# Patient Record
Sex: FEMALE | URBAN - METROPOLITAN AREA
[De-identification: names, ages, dates, MRNs, and addresses within clinical notes are randomized per-mention and may not be internally consistent; named-entity substitution may affect disease eponyms.]

---

## 2017-02-08 ENCOUNTER — OFFICE VISIT (OUTPATIENT)
Dept: FAMILY MEDICINE | Facility: CLINIC | Age: 32
End: 2017-02-08
Payer: COMMERCIAL

## 2017-02-08 VITALS
HEIGHT: 68 IN | DIASTOLIC BLOOD PRESSURE: 72 MMHG | BODY MASS INDEX: 23.19 KG/M2 | SYSTOLIC BLOOD PRESSURE: 100 MMHG | OXYGEN SATURATION: 100 % | WEIGHT: 153 LBS | HEART RATE: 72 BPM | TEMPERATURE: 98.2 F

## 2017-02-08 DIAGNOSIS — K13.0 LIP DRYNESS: Primary | ICD-10-CM

## 2017-02-08 PROCEDURE — 99213 OFFICE O/P EST LOW 20 MIN: CPT | Performed by: FAMILY MEDICINE

## 2017-02-08 RX ORDER — DESONIDE 0.5 MG/G
CREAM TOPICAL
Qty: 60 G | Refills: 0 | Status: SHIPPED | OUTPATIENT
Start: 2017-02-08

## 2017-02-08 NOTE — PROGRESS NOTES
SUBJECTIVE:                                                    Aram Sloan is a 31 year old female who presents to clinic today for the following health issues:    Concern - dry/burning lips     Onset: 1 week     Description:   C/o dry burning lips    Intensity: moderate    Progression of Symptoms:  worsening    Accompanying Signs & Symptoms:  Redness/cracking         Therapies Tried and outcome: lip balm          Problem list and histories reviewed & adjusted, as indicated.  Additional history: as documented    Patient Active Problem List   Diagnosis     Migraine with aura and without status migrainosus, not intractable     Past Surgical History   Procedure Laterality Date      section              Social History   Substance Use Topics     Smoking status: Never Smoker      Smokeless tobacco: Not on file     Alcohol Use: No     Family History   Problem Relation Age of Onset     DIABETES No family hx of      Coronary Artery Disease No family hx of      Hyperlipidemia No family hx of      Hypertension Father      CEREBROVASCULAR DISEASE No family hx of      Breast Cancer No family hx of      Colon Cancer No family hx of          Current Outpatient Prescriptions   Medication Sig Dispense Refill     desonide (DESOWEN) 0.05 % cream Apply sparingly to affected area three times daily for 14 days. 60 g 0     ZOLMitriptan (ZOMIG) 2.5 MG tablet Take 1-2 tablets (2.5-5 mg) by mouth at onset of headache for migraine May repeat in 2 hours. Max 4 tablets/24 hours. 18 tablet 1     Problem list, Medication list, Allergies, and Medical/Social/Surgical histories reviewed in EPIC and updated as appropriate.    ROS:  C: NEGATIVE for fever, chills, change in weight  E/M: NEGATIVE for ear, mouth and throat problems  R: NEGATIVE for significant cough or SOB  CV: NEGATIVE for chest pain, palpitations or peripheral edema    OBJECTIVE:                                                    /72 mmHg  Pulse 72  Temp([facility])  "98.2  F (36.8  C) (Tympanic)  Ht 5' 7.75\" (1.721 m)  Wt 153 lb (69.4 kg)  BMI 23.43 kg/m2  SpO2 100%  LMP 01/24/2017 (Exact Date)  Body mass index is 23.43 kg/(m^2).   GENERAL: healthy, alert, well nourished, well hydrated, no distress  HENT: ear canals- normal; TMs- normal; Nose- normal; Mouth- no ulcers, no lesions  NECK: no tenderness, no adenopathy, no asymmetry, no masses, no stiffness; thyroid- normal to palpation  RESP: lungs clear to auscultation - no rales, no rhonchi, no wheezes    Lips are dry small cracks and some discoloration.      ASSESSMENT/PLAN:                                                        ICD-10-CM    1. Lip dryness K13.0 desonide (DESOWEN) 0.05 % cream       Patient has lip dryness with some lip discoloration, suggested to use the  Vaseline all the time, use desonide cream 3 times a day for a week and then BID for a week, if no improvement, may follow up to see if any other etiology .  Avoid lip touching by tongue if possible.    Jonas Britton MD  [facility]    "

## 2017-02-09 NOTE — NURSING NOTE
"Chief Complaint   Patient presents with     Mouth/Lip Problem       Initial /72 mmHg  Pulse 72  Temp(Src) 98.2  F (36.8  C) (Tympanic)  Ht 5' 7.75\" (1.721 m)  Wt 153 lb (69.4 kg)  BMI 23.43 kg/m2  SpO2 100%  LMP 01/24/2017 (Exact Date) Estimated body mass index is 23.43 kg/(m^2) as calculated from the following:    Height as of this encounter: 5' 7.75\" (1.721 m).    Weight as of this encounter: 153 lb (69.4 kg).  Medication Reconciliation: complete    Current Outpatient Prescriptions   Medication Sig Dispense Refill     ZOLMitriptan (ZOMIG) 2.5 MG tablet Take 1-2 tablets (2.5-5 mg) by mouth at onset of headache for migraine May repeat in 2 hours. Max 4 tablets/24 hours. 18 tablet 1       Jonah M, CMA    "

## 2017-06-29 ENCOUNTER — OFFICE VISIT (OUTPATIENT)
Dept: FAMILY MEDICINE | Facility: CLINIC | Age: 32
End: 2017-06-29
Payer: COMMERCIAL

## 2017-06-29 VITALS
TEMPERATURE: 98.2 F | BODY MASS INDEX: 23.52 KG/M2 | OXYGEN SATURATION: 98 % | DIASTOLIC BLOOD PRESSURE: 66 MMHG | RESPIRATION RATE: 16 BRPM | SYSTOLIC BLOOD PRESSURE: 95 MMHG | HEIGHT: 68 IN | WEIGHT: 155.2 LBS | HEART RATE: 68 BPM

## 2017-06-29 DIAGNOSIS — M72.2 PLANTAR FASCIITIS: Primary | ICD-10-CM

## 2017-06-29 PROCEDURE — 99213 OFFICE O/P EST LOW 20 MIN: CPT | Performed by: PHYSICIAN ASSISTANT

## 2017-06-29 RX ORDER — NAPROXEN 500 MG/1
500 TABLET ORAL 2 TIMES DAILY WITH MEALS
Qty: 30 TABLET | Refills: 0 | Status: SHIPPED | OUTPATIENT
Start: 2017-06-29

## 2017-06-29 NOTE — PROGRESS NOTES
SUBJECTIVE:                                                    Aram Sloan is a 31 year old female who presents to clinic today for the following health issues:      Joint Pain    Onset: One week ago    Description:   Location: Rt heel  Character: Dull ache and pain when pt walks    Intensity: severe    Progression of Symptoms: intermittent, some days it feels fine and other days it's painful    Accompanying Signs & Symptoms:  Other symptoms: none    History:   Previous similar pain: no       Precipitating factors:   Trauma or overuse: no     Alleviating factors:  Improved by: ice    Therapies Tried and outcome: ice pack - did not help         Sudden onset of right heel pain after standing for long periods of time, this is worse with walking, no injury noted.  No swelling or redness noted.          Problem list and histories reviewed & adjusted, as indicated.  Additional history: as documented    Patient Active Problem List   Diagnosis     Migraine with aura and without status migrainosus, not intractable     Past Surgical History:   Procedure Laterality Date      SECTION             Social History   Substance Use Topics     Smoking status: Never Smoker     Smokeless tobacco: Not on file     Alcohol use No     Family History   Problem Relation Age of Onset     DIABETES No family hx of      Coronary Artery Disease No family hx of      Hyperlipidemia No family hx of      Hypertension Father      CEREBROVASCULAR DISEASE No family hx of      Breast Cancer No family hx of      Colon Cancer No family hx of          Current Outpatient Prescriptions   Medication Sig Dispense Refill     naproxen (NAPROSYN) 500 MG tablet Take 1 tablet (500 mg) by mouth 2 times daily (with meals) 30 tablet 0     desonide (DESOWEN) 0.05 % cream Apply sparingly to affected area three times daily for 14 days. (Patient not taking: Reported on 2017) 60 g 0     ZOLMitriptan (ZOMIG) 2.5 MG tablet Take 1-2 tablets (2.5-5 mg) by  "mouth at onset of headache for migraine May repeat in 2 hours. Max 4 tablets/24 hours. (Patient not taking: Reported on 6/29/2017) 18 tablet 1     No Known Allergies    Reviewed and updated as needed this visit by clinical staff       Reviewed and updated as needed this visit by Provider         ROS:  Constitutional, HEENT, cardiovascular, pulmonary, gi and gu systems are negative, except as otherwise noted.    OBJECTIVE:     BP 95/66 (BP Location: Left arm, Patient Position: Chair, Cuff Size: Adult Regular)  Pulse 68  Temp 98.2  F (36.8  C) (Tympanic)  Resp 16  Ht 5' 7.72\" (1.72 m)  Wt 155 lb 3.2 oz (70.4 kg)  LMP 06/07/2017  SpO2 98%  BMI 23.8 kg/m2  Body mass index is 23.8 kg/(m^2).  GENERAL: healthy, alert and no distress  MS: no gross musculoskeletal defects noted, no edema, TTP along right heel, no kole TTP, FROM of right foot, full pedal pulses  PSYCH: mentation appears normal, affect normal/bright    Diagnostic Test Results:  none     ASSESSMENT/PLAN:     1. Plantar fasciitis  Advise exercises ( provided) NSAIDs, and shoe inserts for comfort.  She will following up if pain is persistent  - naproxen (NAPROSYN) 500 MG tablet; Take 1 tablet (500 mg) by mouth 2 times daily (with meals)  Dispense: 30 tablet; Refill: 0          See Patient Instructions    Duncan Cueva PA-C  [facility] [place]  "

## 2017-06-29 NOTE — NURSING NOTE
"Chief Complaint   Patient presents with     Musculoskeletal Problem     rt heel       Initial BP 95/66 (BP Location: Left arm, Patient Position: Chair, Cuff Size: Adult Regular)  Pulse 68  Temp 98.2  F (36.8  C) (Tympanic)  Resp 16  Ht 5' 7.72\" (1.72 m)  Wt 155 lb 3.2 oz (70.4 kg)  LMP 06/07/2017  SpO2 98%  BMI 23.8 kg/m2 Estimated body mass index is 23.8 kg/(m^2) as calculated from the following:    Height as of this encounter: 5' 7.72\" (1.72 m).    Weight as of this encounter: 155 lb 3.2 oz (70.4 kg).  Medication Reconciliation: complete    Lia Colindres MA  "

## 2017-06-29 NOTE — MR AVS SNAPSHOT
After Visit Summary   6/29/2017    Aram Sloan    MRN: [id number]           Patient Information     Date Of Birth          1985        Visit Information        Provider Department      6/29/2017 9:40 AM Duncan Cueva PA-C [facility]        Today's Diagnoses     Plantar fasciitis    -  1      Care Instructions                           Plantar Fasciitis   What is plantar fasciitis?   Plantar fasciitis is a painful inflammation of the bottom of the foot between the ball of the foot and the heel.   How does it occur?   There are several possible causes of plantar fasciitis, including:     wearing high heels     gaining weight     increased walking, standing, or stair-climbing   If you wear high-heeled shoes, including western-style boots, for long periods of time, the tough, tendonlike tissue of the bottom of your foot can become shorter. This layer of tissue is called fascia. Pain occurs when you stretch fascia that has shortened. This painful stretching might happen, for example, when you walk barefoot after getting out of bed in the morning.   If you gain weight, you might be more likely to have plantar fasciitis, especially if you walk a lot or  shoes with poor heel cushioning. Normally there is a pad of fatty tissue under your heel bone. Weight gain might break down this fat pad and cause heel pain.   Runners may get plantar fasciitis when they change their workout and increase their mileage or frequency of workouts. It can also occur with a change in exercise surface or terrain, or if your shoes are worn out and don't provide enough cushion for your heels.   If the arches of your foot are abnormally high or low, you are more likely to develop plantar fasciitis than if your arches are normal.   What are the symptoms?   The main symptom of plantar fasciitis is heel pain when you walk. You may also feel pain when you stand and possibly even when you are  resting. This pain typically occurs first thing in the morning after you get out of bed, when your foot is placed flat on the floor. The pain occurs because you are stretching the plantar fascia. The pain usually lessens with more walking, but you may have it again after periods of rest.   You may feel no pain when you are sleeping because the position of your feet during rest allows the fascia to shorten and relax.   How is it diagnosed?   Your healthcare provider will ask about your symptoms. He or she will ask if the bottom of your heel is tender and if you have pain when you stretch the bottom of your foot. An X-ray of your heel may be done.   How is it treated?     Give your painful heel lots of rest. You may need to stay completely off your foot for several days when the pain is severe.     Your healthcare provider may recommend or prescribe anti-inflammatory medicines, such as aspirin or ibuprofen. These drugs decrease pain and inflammation. Adults aged 65 years and older should not take non-steroidal anti-inflammatory medicine for more than 7 days without their healthcare provider's approval. Resting your heel on an ice pack for a few minutes several times a day can also help.     Try to cushion your foot. You can do this by wearing athletic shoes, even at work, for awhile. Heel cushions can also be used. The cushions should be worn in both shoes. They are most helpful if you are overweight or an older adult.     Your provider may recommend special arch supports or inserts for your shoes called orthotics, either custom-made or off the shelf. These supports can be particularly helpful if you have flat feet or high arches.     Your provider may recommend an injection of a cortisone-like medicine.     Lose weight if needed.     A night splint may be recommended. This will keep the plantar fascia stretched while you are sleeping.     Physical therapy for additional treatments may be recommended     Surgery is  rarely needed.   How long will the effects last?   You may find that the pain is sometimes worse and sometimes better over time. If you get treatment soon after you notice the pain, the symptoms should stop after several weeks. If, however, you have had plantar fasciitis for a long time, it may take many weeks to months for the pain to go away.   When can I return to my normal activities?   Everyone recovers from an injury at a different rate. Return to your activities will be determined by how soon your foot recovers, not by how many days or weeks it has been since your injury has occurred. In general, the longer you have symptoms before you start treatment, the longer it will take to get better. The goal of rehabilitation is to return you to your normal activities as soon as is safely possible. If you return too soon you may worsen your injury.   You may safely return to your activities when, starting from the top of the list and progressing to the end, each of the following is true:     You have full range of motion in the injured foot compared with the uninjured foot.     You have full strength of the injured foot compared with the uninjured foot.     You can walk straight ahead without significant pain or limping.   How can I prevent plantar fasciitis?   The best way to prevent plantar fasciitis is to wear shoes that are well made and fit your feet. This is especially important when you exercise or walk a lot or stand for a long time on hard surfaces. Get new athletic shoes before your old shoes stop supporting and cushioning your feet.   You should also:     Avoid repeated jarring to the heel.     Keep a healthy weight.     Do your leg and foot stretching exercises regularly.   Developed by [employer].   Published by [employer].   Last modified: 2008-08-11   Last reviewed: 2008-07-07   This content is reviewed periodically and is subject to change as new health information becomes available. The information  is intended to inform and educate and is not a replacement for medical evaluation, advice, diagnosis or treatment by a healthcare professional.   Sports Medicine Advisor 2009.1 Index  Sports Medicine Advisor 2009.1 Credits     2009 [facility] and/or its affiliates. All Rights Reserved.               Plantar Fasciitis Rehabilitation Exercises   You may begin exercising the muscles of your foot right away by gently stretching them as follows:     Prone hip extension: Lie on your stomach with your legs straight out behind you. Tighten the buttocks and thigh muscles of your injured leg and lift it off the floor about 8 inches. Keep your knee straight. Hold for 5 seconds. Then lower your leg and relax. Do 3 sets of 10.     Towel stretch: Sit on a hard surface with one leg stretched out in front of you. Loop a towel around your toes and the ball of your foot and pull the towel toward your body keeping your knee straight. Hold this position for 15 to 30 seconds then relax. Repeat 3 times.   When the towel stretch becomes too easy, you may begin doing the standing calf stretch.     Standing calf stretch: Facing a wall, put your hands against the wall at about eye level. Keep one leg back with the heel on the floor, and the other leg forward. Turn your back foot slightly inward (as if you were pigeon-toed) as you slowly lean into the wall until you feel a stretch in the back of your calf. Hold for 15 to 30 seconds. Repeat 3 times and then switch the position of your legs and repeat the exercise 3 times. Do this exercise several times each day.     Sitting plantar fascia stretch: Sit in a chair and cross one foot over your other knee. Grab the base of your toes and pull them back toward your leg until you feel a comfortable stretch. Hold 15 seconds and repeat 3 times.   When you can stand comfortably on your injured foot, you can begin standing to stretch the bottom of your foot using the plantar fascia stretch.     Achilles  stretch: Stand with the ball of one foot on a stair. Reach for the bottom step with your heel until you feel a stretch in the arch of your foot. Hold this position for 15 to 30 seconds and then relax. Repeat 3 times.   After you have stretched the bottom muscles of your foot, you can begin strengthening the top muscles of your foot.     Frozen can roll: Roll your bare injured foot back and forth from your heel to your mid-arch over a frozen juice can. Repeat for 3 to 5 minutes. This exercise is particularly helpful if done first thing in the morning.     Towel pickup: With your heel on the ground,  a towel with your toes. Release. Repeat 10 to 20 times. When this gets easy, add more resistance by placing a book or small weight on the towel.     Balance and reach exercises   Stand upright next to a chair with your injured leg farthest from the chair. This will provide you with support if you need it. Stand just on the foot of your injured leg. Try to raise the arch of this foot while keeping your toes on the floor.   A. Keep your foot in this position and reach forward in front of you with the hand farthest away from the chair, allowing your knee to bend. Repeat this 10 times while maintaining the arch height. This exercise can be made more difficult by reaching farther in front of you. Do 2 sets.   B.  the same position as above. While maintaining your arch height, reach the hand farthest away from the chair across your body toward the chair. The farther you reach, the more challenging the exercise. Do 2 sets of 10.     Heel raise: Balance yourself while standing behind a chair or counter. Using the chair to help you, raise your body up onto your toes and hold for 5 seconds. Then slowly lower yourself down without holding onto the chair. Hold onto the chair or counter if you need to. When this exercise becomes less painful, try lowering on one leg only. Repeat 10 times. Do 3 sets of 10.     Side-lying  leg lift: Lying on your uninjured side, tighten the front thigh muscles on your top leg and lift that leg 8 to 10 inches away from the other leg. Keep the leg straight and lower slowly. Do 3 sets of 10.   Written by Swathi Joe, MS, PT, and Gisselle Anderson PT, [facility], OCS, for [employer].   Published by [employer].   Last modified: 2009-02-09   Last reviewed: 2008-07-07   This content is reviewed periodically and is subject to change as new health information becomes available. The information is intended to inform and educate and is not a replacement for medical evaluation, advice, diagnosis or treatment by a healthcare professional.   Sports Medicine Advisor 2009.1 Index                      Follow-ups after your visit        Who to contact     If you have questions or need follow up information about today's clinic visit or your schedule please contact [facility] [place] directly at [phone].  Normal or non-critical lab and imaging results will be communicated to you by MyChart, letter or phone within 4 business days after the clinic has received the results. If you do not hear from us within 7 days, please contact the clinic through [employer] [facility] or phone. If you have a critical or abnormal lab result, we will notify you by phone as soon as possible.  Submit refill requests through [employer] or call your pharmacy and they will forward the refill request to us. Please allow 3 business days for your refill to be completed.          Additional Information About Your Visit        [employer] Information     [employer] gives you secure access to your electronic health record. If you see a primary care provider, you can also send messages to your care team and make appointments. If you have questions, please call your primary care clinic.  If you do not have a primary care provider, please call [phone] and they will assist you.        Care EveryWhere ID     This is your Care EveryWhere ID. This could be used by  "other organizations to access your [facility] medical records  [id number]        Your Vitals Were     Pulse Temperature Respirations Height Last Period Pulse Oximetry    68 98.2  F (36.8  C) (Tympanic) 16 5' 7.72\" (1.72 m) 06/07/2017 98%    BMI (Body Mass Index)                   23.8 kg/m2            Blood Pressure from Last 3 Encounters:   06/29/17 95/66   02/08/17 100/72   11/16/16 90/70    Weight from Last 3 Encounters:   06/29/17 155 lb 3.2 oz (70.4 kg)   02/08/17 153 lb (69.4 kg)   11/16/16 157 lb (71.2 kg)              Today, you had the following     No orders found for display         Today's Medication Changes          These changes are accurate as of: 6/29/17 10:13 AM.  If you have any questions, ask your nurse or doctor.               Start taking these medicines.        Dose/Directions    naproxen 500 MG tablet   Commonly known as:  NAPROSYN   Used for:  Plantar fasciitis   Started by:  Duncan Cueva PA-C        Dose:  500 mg   Take 1 tablet (500 mg) by mouth 2 times daily (with meals)   Quantity:  30 tablet   Refills:  0            Where to get your medicines      [facility] - [place], [place] - [address] AT [address]  [address] [facility] [place] [address]     Phone:  [phone]     naproxen 500 MG tablet                Primary Care Provider    None Specified       No primary provider on file.        Equal Access to Services     ERON UNGER AH: Hadii tony clancyo Soken, waaxda luqadaha, qaybta kaalmada adeegyada, nasim posadas. So [facility] [phone].    ATENCIÓN: Si habla español, tiene a saldana disposición servicios gratuitos de asistencia lingüística. Llame al [phone].    We comply with applicable federal civil rights laws and [place] laws. We do not discriminate on the basis of race, color, national origin, age, disability sex, sexual orientation or gender " identity.            Thank you!     Thank you for choosing [facility] [place]  for your care. Our goal is always to provide you with excellent care. Hearing back from our patients is one way we can continue to improve our services. Please take a few minutes to complete the written survey that you may receive in the mail after your visit with us. Thank you!             Your Updated Medication List - Protect others around you: Learn how to safely use, store and throw away your medicines at www.disposemymeds.org.          This list is accurate as of: 6/29/17 10:13 AM.  Always use your most recent med list.                   Brand Name Dispense Instructions for use Diagnosis    desonide 0.05 % cream    DESOWEN    60 g    Apply sparingly to affected area three times daily for 14 days.    Lip dryness       naproxen 500 MG tablet    NAPROSYN    30 tablet    Take 1 tablet (500 mg) by mouth 2 times daily (with meals)    Plantar fasciitis       ZOLMitriptan 2.5 MG tablet    ZOMIG    18 tablet    Take 1-2 tablets (2.5-5 mg) by mouth at onset of headache for migraine May repeat in 2 hours. Max 4 tablets/24 hours.    Migraine with aura and without status migrainosus, not intractable

## 2017-06-29 NOTE — PATIENT INSTRUCTIONS
Plantar Fasciitis   What is plantar fasciitis?   Plantar fasciitis is a painful inflammation of the bottom of the foot between the ball of the foot and the heel.   How does it occur?   There are several possible causes of plantar fasciitis, including:     wearing high heels     gaining weight     increased walking, standing, or stair-climbing   If you wear high-heeled shoes, including western-style boots, for long periods of time, the tough, tendonlike tissue of the bottom of your foot can become shorter. This layer of tissue is called fascia. Pain occurs when you stretch fascia that has shortened. This painful stretching might happen, for example, when you walk barefoot after getting out of bed in the morning.   If you gain weight, you might be more likely to have plantar fasciitis, especially if you walk a lot or  shoes with poor heel cushioning. Normally there is a pad of fatty tissue under your heel bone. Weight gain might break down this fat pad and cause heel pain.   Runners may get plantar fasciitis when they change their workout and increase their mileage or frequency of workouts. It can also occur with a change in exercise surface or terrain, or if your shoes are worn out and don't provide enough cushion for your heels.   If the arches of your foot are abnormally high or low, you are more likely to develop plantar fasciitis than if your arches are normal.   What are the symptoms?   The main symptom of plantar fasciitis is heel pain when you walk. You may also feel pain when you stand and possibly even when you are resting. This pain typically occurs first thing in the morning after you get out of bed, when your foot is placed flat on the floor. The pain occurs because you are stretching the plantar fascia. The pain usually lessens with more walking, but you may have it again after periods of rest.   You may feel no pain when you are sleeping because the position of your feet  during rest allows the fascia to shorten and relax.   How is it diagnosed?   Your healthcare provider will ask about your symptoms. He or she will ask if the bottom of your heel is tender and if you have pain when you stretch the bottom of your foot. An X-ray of your heel may be done.   How is it treated?     Give your painful heel lots of rest. You may need to stay completely off your foot for several days when the pain is severe.     Your healthcare provider may recommend or prescribe anti-inflammatory medicines, such as aspirin or ibuprofen. These drugs decrease pain and inflammation. Adults aged 65 years and older should not take non-steroidal anti-inflammatory medicine for more than 7 days without their healthcare provider's approval. Resting your heel on an ice pack for a few minutes several times a day can also help.     Try to cushion your foot. You can do this by wearing athletic shoes, even at work, for awhile. Heel cushions can also be used. The cushions should be worn in both shoes. They are most helpful if you are overweight or an older adult.     Your provider may recommend special arch supports or inserts for your shoes called orthotics, either custom-made or off the shelf. These supports can be particularly helpful if you have flat feet or high arches.     Your provider may recommend an injection of a cortisone-like medicine.     Lose weight if needed.     A night splint may be recommended. This will keep the plantar fascia stretched while you are sleeping.     Physical therapy for additional treatments may be recommended     Surgery is rarely needed.   How long will the effects last?   You may find that the pain is sometimes worse and sometimes better over time. If you get treatment soon after you notice the pain, the symptoms should stop after several weeks. If, however, you have had plantar fasciitis for a long time, it may take many weeks to months for the pain to go away.   When can I return to  my normal activities?   Everyone recovers from an injury at a different rate. Return to your activities will be determined by how soon your foot recovers, not by how many days or weeks it has been since your injury has occurred. In general, the longer you have symptoms before you start treatment, the longer it will take to get better. The goal of rehabilitation is to return you to your normal activities as soon as is safely possible. If you return too soon you may worsen your injury.   You may safely return to your activities when, starting from the top of the list and progressing to the end, each of the following is true:     You have full range of motion in the injured foot compared with the uninjured foot.     You have full strength of the injured foot compared with the uninjured foot.     You can walk straight ahead without significant pain or limping.   How can I prevent plantar fasciitis?   The best way to prevent plantar fasciitis is to wear shoes that are well made and fit your feet. This is especially important when you exercise or walk a lot or stand for a long time on hard surfaces. Get new athletic shoes before your old shoes stop supporting and cushioning your feet.   You should also:     Avoid repeated jarring to the heel.     Keep a healthy weight.     Do your leg and foot stretching exercises regularly.   Developed by [employer].   Published by [employer].   Last modified: 2008-08-11   Last reviewed: 2008-07-07   This content is reviewed periodically and is subject to change as new health information becomes available. The information is intended to inform and educate and is not a replacement for medical evaluation, advice, diagnosis or treatment by a healthcare professional.   Sports Medicine Advisor 2009.1 Index  Sports Medicine Advisor 2009.1 Credits     2009 [employer] and/or its affiliates. All Rights Reserved.               Plantar Fasciitis Rehabilitation Exercises   You may begin  exercising the muscles of your foot right away by gently stretching them as follows:     Prone hip extension: Lie on your stomach with your legs straight out behind you. Tighten the buttocks and thigh muscles of your injured leg and lift it off the floor about 8 inches. Keep your knee straight. Hold for 5 seconds. Then lower your leg and relax. Do 3 sets of 10.     Towel stretch: Sit on a hard surface with one leg stretched out in front of you. Loop a towel around your toes and the ball of your foot and pull the towel toward your body keeping your knee straight. Hold this position for 15 to 30 seconds then relax. Repeat 3 times.   When the towel stretch becomes too easy, you may begin doing the standing calf stretch.     Standing calf stretch: Facing a wall, put your hands against the wall at about eye level. Keep one leg back with the heel on the floor, and the other leg forward. Turn your back foot slightly inward (as if you were pigeon-toed) as you slowly lean into the wall until you feel a stretch in the back of your calf. Hold for 15 to 30 seconds. Repeat 3 times and then switch the position of your legs and repeat the exercise 3 times. Do this exercise several times each day.     Sitting plantar fascia stretch: Sit in a chair and cross one foot over your other knee. Grab the base of your toes and pull them back toward your leg until you feel a comfortable stretch. Hold 15 seconds and repeat 3 times.   When you can stand comfortably on your injured foot, you can begin standing to stretch the bottom of your foot using the plantar fascia stretch.     Achilles stretch: Stand with the ball of one foot on a stair. Reach for the bottom step with your heel until you feel a stretch in the arch of your foot. Hold this position for 15 to 30 seconds and then relax. Repeat 3 times.   After you have stretched the bottom muscles of your foot, you can begin strengthening the top muscles of your foot.     Frozen can roll: Roll  your bare injured foot back and forth from your heel to your mid-arch over a frozen juice can. Repeat for 3 to 5 minutes. This exercise is particularly helpful if done first thing in the morning.     Towel pickup: With your heel on the ground,  a towel with your toes. Release. Repeat 10 to 20 times. When this gets easy, add more resistance by placing a book or small weight on the towel.     Balance and reach exercises   Stand upright next to a chair with your injured leg farthest from the chair. This will provide you with support if you need it. Stand just on the foot of your injured leg. Try to raise the arch of this foot while keeping your toes on the floor.   A. Keep your foot in this position and reach forward in front of you with the hand farthest away from the chair, allowing your knee to bend. Repeat this 10 times while maintaining the arch height. This exercise can be made more difficult by reaching farther in front of you. Do 2 sets.   B.  the same position as above. While maintaining your arch height, reach the hand farthest away from the chair across your body toward the chair. The farther you reach, the more challenging the exercise. Do 2 sets of 10.     Heel raise: Balance yourself while standing behind a chair or counter. Using the chair to help you, raise your body up onto your toes and hold for 5 seconds. Then slowly lower yourself down without holding onto the chair. Hold onto the chair or counter if you need to. When this exercise becomes less painful, try lowering on one leg only. Repeat 10 times. Do 3 sets of 10.     Side-lying leg lift: Lying on your uninjured side, tighten the front thigh muscles on your top leg and lift that leg 8 to 10 inches away from the other leg. Keep the leg straight and lower slowly. Do 3 sets of 10.   Written by Swathi Joe, MS, PT, and Gisselle Anderson PT, [facility], OCS, for [employer].   Published by [employer].   Last modified: 2009-02-09   Last  reviewed: 2008-07-07   This content is reviewed periodically and is subject to change as new health information becomes available. The information is intended to inform and educate and is not a replacement for medical evaluation, advice, diagnosis or treatment by a healthcare professional.   Sports Medicine Advisor 2009.1 Index

## 2017-09-22 ENCOUNTER — OFFICE VISIT (OUTPATIENT)
Dept: FAMILY MEDICINE | Facility: CLINIC | Age: 32
End: 2017-09-22
Payer: COMMERCIAL

## 2017-09-22 VITALS
OXYGEN SATURATION: 99 % | BODY MASS INDEX: 24.07 KG/M2 | WEIGHT: 157 LBS | SYSTOLIC BLOOD PRESSURE: 94 MMHG | HEART RATE: 70 BPM | TEMPERATURE: 98.9 F | DIASTOLIC BLOOD PRESSURE: 60 MMHG

## 2017-09-22 DIAGNOSIS — G43.109 MIGRAINE WITH AURA AND WITHOUT STATUS MIGRAINOSUS, NOT INTRACTABLE: Primary | ICD-10-CM

## 2017-09-22 DIAGNOSIS — Z23 NEED FOR PROPHYLACTIC VACCINATION AND INOCULATION AGAINST INFLUENZA: ICD-10-CM

## 2017-09-22 PROCEDURE — 99214 OFFICE O/P EST MOD 30 MIN: CPT | Performed by: FAMILY MEDICINE

## 2017-09-22 PROCEDURE — 90471 IMMUNIZATION ADMIN: CPT | Performed by: FAMILY MEDICINE

## 2017-09-22 PROCEDURE — 90686 IIV4 VACC NO PRSV 0.5 ML IM: CPT | Performed by: FAMILY MEDICINE

## 2017-09-22 RX ORDER — ZOLMITRIPTAN 2.5 MG/1
2.5-5 TABLET, FILM COATED ORAL
Qty: 18 TABLET | Refills: 1 | Status: SHIPPED | OUTPATIENT
Start: 2017-09-22 | End: 2018-01-11

## 2017-09-22 NOTE — MR AVS SNAPSHOT
After Visit Summary   9/22/2017    Aram Sloan    MRN: [id number]           Patient Information     Date Of Birth          1985        Visit Information        Provider Department      9/22/2017 2:00 PM Jonas Britton MD [facility] [place]        Today's Diagnoses     Migraine with aura and without status migrainosus, not intractable    -  1       Follow-ups after your visit        Who to contact     If you have questions or need follow up information about today's clinic visit or your schedule please contact [facility] [place] directly at [phone].  Normal or non-critical lab and imaging results will be communicated to you by MyChart, letter or phone within 4 business days after the clinic has received the results. If you do not hear from us within 7 days, please contact the clinic through [employer] or phone. If you have a critical or abnormal lab result, we will notify you by phone as soon as possible.  Submit refill requests through [employer] or call your pharmacy and they will forward the refill request to us. Please allow 3 business days for your refill to be completed.          Additional Information About Your Visit        MyChart Information     [employer] gives you secure access to your electronic health record. If you see a primary care provider, you can also send messages to your care team and make appointments. If you have questions, please call your primary care clinic.  If you do not have a primary care provider, please call [phone] and they will assist you.        Care EveryWhere ID     This is your Care EveryWhere ID. This could be used by other organizations to access your [facility] medical records  [id number]        Your Vitals Were     Pulse Temperature Last Period Pulse Oximetry BMI (Body Mass Index)       70 98.9  F (37.2  C) (Tympanic) 09/04/2017 99% 24.07 kg/m2        Blood Pressure from Last 3 Encounters:   09/22/17 94/60   06/29/17 95/66   02/08/17  100/72    Weight from Last 3 Encounters:   09/22/17 157 lb (71.2 kg)   06/29/17 155 lb 3.2 oz (70.4 kg)   02/08/17 153 lb (69.4 kg)              Today, you had the following     No orders found for display         Where to get your medicines      These medications were sent to [facility]/pharmacy #[id number] - [place], [place] - [address]  [address], [place] [address]     Phone:  [phone]     ZOLMitriptan 2.5 MG tablet          Primary Care Provider    None Specified       No primary provider on file.        Equal Access to Services     [facility]: Hadii tony thomas hadasho Soken, waaxda luqadaha, qaybta kaalmada adekathyyada, nasim yates . So [facility] [phone].    ATENCIÓN: Si habla español, tiene a saldana disposición servicios gratuitos de asistencia lingüística. Llame al [phone].    We comply with applicable federal civil rights laws and [place] laws. We do not discriminate on the basis of race, color, national origin, age, disability sex, sexual orientation or gender identity.            Thank you!     Thank you for choosing [facility] [place]  for your care. Our goal is always to provide you with excellent care. Hearing back from our patients is one way we can continue to improve our services. Please take a few minutes to complete the written survey that you may receive in the mail after your visit with us. Thank you!             Your Updated Medication List - Protect others around you: Learn how to safely use, store and throw away your medicines at www.disposemymeds.org.          This list is accurate as of: 9/22/17  2:26 PM.  Always use your most recent med list.                   Brand Name Dispense Instructions for use Diagnosis    desonide 0.05 % cream    DESOWEN    60 g    Apply sparingly to affected area three times daily for 14 days.    Lip dryness       naproxen 500 MG tablet    NAPROSYN    30 tablet    Take 1 tablet (500 mg) by mouth 2 times  daily (with meals)    Plantar fasciitis       ZOLMitriptan 2.5 MG tablet    ZOMIG    18 tablet    Take 1-2 tablets (2.5-5 mg) by mouth at onset of headache for migraine May repeat in 2 hours. Max 4 tablets/24 hours.    Migraine with aura and without status migrainosus, not intractable

## 2017-09-22 NOTE — PROGRESS NOTES
SUBJECTIVE:   Aram Sloan is a 32 year old female who presents to clinic today for the following health issues:      Migraine Follow-Up    Headaches symptoms:  Waxing and wanning    Frequency: monthly     Duration of headaches: 2 days    Able to do normal daily activities/work with migraines: Yes    Rescue/Relief medication:ibuprofen (Advil, Motrin) and Zomig              Effectiveness: moderate relief    In the past 4 weeks, how often have you gone to Urgent Care or the emergency room because of your headaches?  0     Patient only used headache medication once or twice in 3-4 months.    She has associated nausea and some photophobia.    Traveling to [place] and would like to get a refill on her medication .            Problem list and histories reviewed & adjusted, as indicated.  Additional history: as documented    Patient Active Problem List   Diagnosis     Migraine with aura and without status migrainosus, not intractable     Past Surgical History:   Procedure Laterality Date      SECTION             Social History   Substance Use Topics     Smoking status: Never Smoker     Smokeless tobacco: Not on file     Alcohol use No     Family History   Problem Relation Age of Onset     DIABETES No family hx of      Coronary Artery Disease No family hx of      Hyperlipidemia No family hx of      Hypertension Father      CEREBROVASCULAR DISEASE No family hx of      Breast Cancer No family hx of      Colon Cancer No family hx of          Current Outpatient Prescriptions   Medication Sig Dispense Refill     ZOLMitriptan (ZOMIG) 2.5 MG tablet Take 1-2 tablets (2.5-5 mg) by mouth at onset of headache for migraine May repeat in 2 hours. Max 4 tablets/24 hours. 18 tablet 1     naproxen (NAPROSYN) 500 MG tablet Take 1 tablet (500 mg) by mouth 2 times daily (with meals) (Patient not taking: Reported on 2017) 30 tablet 0     desonide (DESOWEN) 0.05 % cream Apply sparingly to affected area three times daily for 14  days. (Patient not taking: Reported on 9/22/2017) 60 g 0     [DISCONTINUED] ZOLMitriptan (ZOMIG) 2.5 MG tablet Take 1-2 tablets (2.5-5 mg) by mouth at onset of headache for migraine May repeat in 2 hours. Max 4 tablets/24 hours. 18 tablet 1         Reviewed and updated as needed this visit by clinical staffTobacco  Allergies  Meds       Reviewed and updated as needed this visit by Provider         ROS:  C: NEGATIVE for fever, chills, change in weight  E/M: NEGATIVE for ear, mouth and throat problems  R: NEGATIVE for significant cough or SOB  CV: NEGATIVE for chest pain, palpitations or peripheral edema    OBJECTIVE:                                                    BP 94/60  Pulse 70  Temp 98.9  F (37.2  C) (Tympanic)  Wt 157 lb (71.2 kg)  LMP 09/04/2017  SpO2 99%  BMI 24.07 kg/m2  Body mass index is 24.07 kg/(m^2).   GENERAL: healthy, alert, well nourished, well hydrated, no distress  HENT: ear canals- normal; TMs- normal; Nose- normal; Mouth- no ulcers, no lesions  NECK: no tenderness, no adenopathy, no asymmetry, no masses, no stiffness; thyroid- normal to palpation  RESP: lungs clear to auscultation - no rales, no rhonchi, no wheezes  CV: regular rates and rhythm, normal S1 S2, no S3 or S4 and no murmur, no click or rub -  ABDOMEN: soft, no tenderness, no  hepatosplenomegaly, no masses, normal bowel sounds         ASSESSMENT/PLAN:                                                        ICD-10-CM    1. Migraine with aura and without status migrainosus, not intractable G43.109 ZOLMitriptan (ZOMIG) 2.5 MG tablet       Discussed with the patient about the natural history of the disease. I have advised patient  take this medication at the onset of headache. Try not to wait till headache is worse it might not work very well during that time.  If you may need to supplement additional medication you can add ibuprofen 400 -600 mg as needed.  Follow-up as needed. Keep yourself hydrated    Jonas Britton MD  [place]  [facility]

## 2017-09-22 NOTE — NURSING NOTE
"Chief Complaint   Patient presents with     Headache       Initial BP 94/60  Pulse 70  Temp 98.9  F (37.2  C) (Tympanic)  Wt 157 lb (71.2 kg)  LMP 09/04/2017  SpO2 99%  BMI 24.07 kg/m2 Estimated body mass index is 24.07 kg/(m^2) as calculated from the following:    Height as of 6/29/17: 5' 7.72\" (1.72 m).    Weight as of this encounter: 157 lb (71.2 kg).  Medication Reconciliation: complete    Current Outpatient Prescriptions   Medication Sig Dispense Refill     ZOLMitriptan (ZOMIG) 2.5 MG tablet Take 1-2 tablets (2.5-5 mg) by mouth at onset of headache for migraine May repeat in 2 hours. Max 4 tablets/24 hours. 18 tablet 1     naproxen (NAPROSYN) 500 MG tablet Take 1 tablet (500 mg) by mouth 2 times daily (with meals) (Patient not taking: Reported on 9/22/2017) 30 tablet 0     desonide (DESOWEN) 0.05 % cream Apply sparingly to affected area three times daily for 14 days. (Patient not taking: Reported on 9/22/2017) 60 g 0       Jonah SNOW, CHAPO  "

## 2017-09-22 NOTE — PROGRESS NOTES
Injectable Influenza Immunization Documentation    1.  Is the person to be vaccinated sick today?   No    2. Does the person to be vaccinated have an allergy to a component   of the vaccine?   No    3. Has the person to be vaccinated ever had a serious reaction   to influenza vaccine in the past?   No    4. Has the person to be vaccinated ever had Guillain-Barré syndrome?   No    Form completed by Jonah SNOW CMA

## 2017-11-14 ENCOUNTER — OFFICE VISIT (OUTPATIENT)
Dept: FAMILY MEDICINE | Facility: CLINIC | Age: 32
End: 2017-11-14
Payer: COMMERCIAL

## 2017-11-14 ENCOUNTER — TELEPHONE (OUTPATIENT)
Dept: FAMILY MEDICINE | Facility: CLINIC | Age: 32
End: 2017-11-14

## 2017-11-14 VITALS
SYSTOLIC BLOOD PRESSURE: 90 MMHG | BODY MASS INDEX: 24.53 KG/M2 | WEIGHT: 160 LBS | OXYGEN SATURATION: 97 % | TEMPERATURE: 98.8 F | DIASTOLIC BLOOD PRESSURE: 60 MMHG | HEART RATE: 77 BPM

## 2017-11-14 DIAGNOSIS — R11.2 NAUSEA AND VOMITING, INTRACTABILITY OF VOMITING NOT SPECIFIED, UNSPECIFIED VOMITING TYPE: Primary | ICD-10-CM

## 2017-11-14 DIAGNOSIS — G43.109 MIGRAINE WITH AURA AND WITHOUT STATUS MIGRAINOSUS, NOT INTRACTABLE: ICD-10-CM

## 2017-11-14 DIAGNOSIS — R11.2 NAUSEA AND VOMITING, INTRACTABILITY OF VOMITING NOT SPECIFIED, UNSPECIFIED VOMITING TYPE: ICD-10-CM

## 2017-11-14 LAB
ALBUMIN UR-MCNC: NEGATIVE MG/DL
APPEARANCE UR: CLEAR
BETA HCG QUAL IFA URINE: NEGATIVE
BILIRUB UR QL STRIP: NEGATIVE
COLOR UR AUTO: YELLOW
GLUCOSE UR STRIP-MCNC: NEGATIVE MG/DL
HGB UR QL STRIP: NEGATIVE
KETONES UR STRIP-MCNC: NEGATIVE MG/DL
LEUKOCYTE ESTERASE UR QL STRIP: NEGATIVE
NITRATE UR QL: NEGATIVE
PH UR STRIP: 7 PH (ref 5–7)
SOURCE: NORMAL
SP GR UR STRIP: 1.01 (ref 1–1.03)
UROBILINOGEN UR STRIP-ACNC: 0.2 EU/DL (ref 0.2–1)

## 2017-11-14 PROCEDURE — 81003 URINALYSIS AUTO W/O SCOPE: CPT | Performed by: FAMILY MEDICINE

## 2017-11-14 PROCEDURE — 99214 OFFICE O/P EST MOD 30 MIN: CPT | Performed by: FAMILY MEDICINE

## 2017-11-14 PROCEDURE — 84703 CHORIONIC GONADOTROPIN ASSAY: CPT | Performed by: FAMILY MEDICINE

## 2017-11-14 PROCEDURE — 36415 COLL VENOUS BLD VENIPUNCTURE: CPT | Performed by: FAMILY MEDICINE

## 2017-11-14 PROCEDURE — 80053 COMPREHEN METABOLIC PANEL: CPT | Performed by: FAMILY MEDICINE

## 2017-11-14 RX ORDER — ONDANSETRON 4 MG/1
4-8 TABLET, ORALLY DISINTEGRATING ORAL
Qty: 20 TABLET | Refills: 1 | Status: SHIPPED | OUTPATIENT
Start: 2017-11-14

## 2017-11-14 RX ORDER — ONDANSETRON 4 MG/1
4-8 TABLET, ORALLY DISINTEGRATING ORAL
Qty: 20 TABLET | Refills: 1 | Status: SHIPPED | OUTPATIENT
Start: 2017-11-14 | End: 2017-11-14

## 2017-11-14 NOTE — LETTER
To whom it may concern.      Anishlissy Luther      1985           Patient is seen in the clinic 11/14/2017. She has history of migraines.  Please consider morning shifts while making schedules for her, to accommodate her migraines headache which can get aggravated with sleep disturbances.                      Sincerely,         Jonas Britton MD   11/14/2017

## 2017-11-14 NOTE — LETTER
November 17, 2017      Aram Sloan  [address]   [place] [address]        Dear ,        I have reviewed your recent labs. Here are the results:     -Liver and gallbladder tests are normal. (ALT,AST, Alk phos, bilirubin), kidney function is normal (Cr, GFR), Sodium is normal, Potassium is normal, Calcium is normal, Glucose is normal (diabetes screening test).   -Urine is normal.   -urine pregnancy test is negative.     Resulted Orders   UA reflex to Microscopic   Result Value Ref Range    Color Urine Yellow     Appearance Urine Clear     Glucose Urine Negative NEG^Negative mg/dL    Bilirubin Urine Negative NEG^Negative    Ketones Urine Negative NEG^Negative mg/dL    Specific Gravity Urine 1.010 1.003 - 1.035    Blood Urine Negative NEG^Negative    pH Urine 7.0 5.0 - 7.0 pH    Protein Albumin Urine Negative NEG^Negative mg/dL    Urobilinogen Urine 0.2 0.2 - 1.0 EU/dL    Nitrite Urine Negative NEG^Negative    Leukocyte Esterase Urine Negative NEG^Negative    Source Midstream Urine    Comprehensive metabolic panel   Result Value Ref Range    Sodium 136 133 - 144 mmol/L    Potassium 3.9 3.4 - 5.3 mmol/L    Chloride 103 94 - 109 mmol/L    Carbon Dioxide 25 20 - 32 mmol/L    Anion Gap 8 3 - 14 mmol/L    Glucose 78 70 - 99 mg/dL    Urea Nitrogen 5 (L) 7 - 30 mg/dL    Creatinine 0.58 0.52 - 1.04 mg/dL    GFR Estimate >90 >60 mL/min/1.7m2      Comment:      Non  GFR Calc    GFR Estimate If Black >90 >60 mL/min/1.7m2      Comment:       GFR Calc    Calcium 9.0 8.5 - 10.1 mg/dL    Bilirubin Total 0.5 0.2 - 1.3 mg/dL    Albumin 3.9 3.4 - 5.0 g/dL    Protein Total 8.4 6.8 - 8.8 g/dL    Alkaline Phosphatase 66 40 - 150 U/L    ALT 23 0 - 50 U/L    AST 19 0 - 45 U/L   Beta HCG qual IFA urine - [facility] and [place]   Result Value Ref Range    Beta HCG Qual IFA Urine Negative NEG^Negative          If you have any questions or concerns, please call the clinic at the number  listed above.       Sincerely,    Jonas Britton MD

## 2017-11-14 NOTE — PROGRESS NOTES
SUBJECTIVE:   Aram Sloan is a 32 year old female who presents to clinic today for the following health issues:      Headache  Onset: 10 days ago  Feeling of nausea and vomiting for the last 2 days. Recently traveled and came back from [place]. Her work shift has changed with more late night work. This has caused her migraine to get worse.   No abdominal pain. She has few episodes of vomiting with  Nausea.  No chest pain or shortness of breath. She feels slight dehydrated. No constipation.    Description:   Location: unilateral but varies as to which side   Character: squeezing pain    Intensity: severe    Progression of Symptoms:  waxing and waning    Accompanying Signs & Symptoms:  Stiff neck: no   Neck or upper back pain: no   Fever: no   Sinus pressure: YES  Nausea or vomiting: YES  Dizziness: no   Numbness: no   Weakness: no   Visual changes: no   Therapies Tried and outcome: Ibuprofen (Advil, Motrin), Tylenol and Zomig no help        Problem list and histories reviewed & adjusted, as indicated.  Additional history: as documented    Patient Active Problem List   Diagnosis     Migraine with aura and without status migrainosus, not intractable     Past Surgical History:   Procedure Laterality Date      SECTION             Social History   Substance Use Topics     Smoking status: Never Smoker     Smokeless tobacco: Not on file     Alcohol use No     Family History   Problem Relation Age of Onset     DIABETES No family hx of      Coronary Artery Disease No family hx of      Hyperlipidemia No family hx of      Hypertension Father      CEREBROVASCULAR DISEASE No family hx of      Breast Cancer No family hx of      Colon Cancer No family hx of          Current Outpatient Prescriptions   Medication Sig Dispense Refill     ondansetron (ZOFRAN ODT) 4 MG ODT tab Take 1-2 tablets (4-8 mg) by mouth 3 times daily (before meals) 20 tablet 1     ZOLMitriptan (ZOMIG) 2.5 MG tablet Take 1-2 tablets (2.5-5 mg) by  mouth at onset of headache for migraine May repeat in 2 hours. Max 4 tablets/24 hours. 18 tablet 1     naproxen (NAPROSYN) 500 MG tablet Take 1 tablet (500 mg) by mouth 2 times daily (with meals) (Patient not taking: Reported on 9/22/2017) 30 tablet 0     desonide (DESOWEN) 0.05 % cream Apply sparingly to affected area three times daily for 14 days. (Patient not taking: Reported on 9/22/2017) 60 g 0         Reviewed and updated as needed this visit by clinical staffTobacco  Allergies       Reviewed and updated as needed this visit by Provider         ROS:  C: NEGATIVE for fever, chills, change in weight  E/M: NEGATIVE for ear, mouth and throat problems  R: NEGATIVE for significant cough or SOB  CV: NEGATIVE for chest pain, palpitations or peripheral edema  Positive for nausea.    OBJECTIVE:                                                    BP 90/60  Pulse 77  Temp 98.8  F (37.1  C) (Tympanic)  Wt 160 lb (72.6 kg)  LMP 10/29/2017  SpO2 97%  BMI 24.53 kg/m2  Body mass index is 24.53 kg/(m^2).   GENERAL: healthy, alert, well nourished, well hydrated, no distress  NECK: no tenderness, no adenopathy, no asymmetry, no masses, no stiffness; thyroid- normal to palpation  RESP: lungs clear to auscultation - no rales, no rhonchi, no wheezes  CV: regular rates and rhythm, normal S1 S2, no S3 or S4 and no murmur, no click or rub -  ABDOMEN: soft, no tenderness, no  hepatosplenomegaly, no masses, normal bowel sounds       ASSESSMENT/PLAN:                                                        ICD-10-CM    1. Nausea and vomiting, intractability of vomiting not specified, unspecified vomiting type R11.2 UA reflex to Microscopic     Comprehensive metabolic panel     ondansetron (ZOFRAN ODT) 4 MG ODT tab     CANCELED: HCG quantitative pregnancy   2. Migraine with aura and without status migrainosus, not intractable G43.109 ondansetron (ZOFRAN ODT) 4 MG ODT tab       Patient has history of migraines which are aggravating  with the night shifts and sleep disturbance. She is accompanied with some degree of nausea and vomiting associated with it   Patient noted slight worsening since she came back from [place] and working late night shifts. She is not able to keep much food down without nausea.  Patient is advised to keep herself hydrated. Will add Zofran for her nausea symptoms. She should take ibuprofen or similar medication for pain, rest.  She is given a letter for her work to accommodate her schedule if possible for morning shift to avoid aggravating her migraine headaches.  She will follow-up in 2-3 days if symptoms are not getting better or getting worse.  Jonas Britton MD  [facility]

## 2017-11-14 NOTE — TELEPHONE ENCOUNTER
Patient calling, she was prescribed Zofran at today's appointment and rx was sent to [facility]. [facility] system is currently down and they requested rx be transferred to another pharmacy. Rx sent in as requested.   Ally Dawson RN   [facility] - Triage

## 2017-11-14 NOTE — MR AVS SNAPSHOT
After Visit Summary   11/14/2017    Aram Sloan    MRN: [id number]           Patient Information     Date Of Birth          1985        Visit Information        Provider Department      11/14/2017 11:20 AM Jonas Britton MD [facility] [place]        Today's Diagnoses     Nausea and vomiting, intractability of vomiting not specified, unspecified vomiting type    -  1    Migraine with aura and without status migrainosus, not intractable           Follow-ups after your visit        Who to contact     If you have questions or need follow up information about today's clinic visit or your schedule please contact [facility] [place] directly at [phone].  Normal or non-critical lab and imaging results will be communicated to you by MyChart, letter or phone within 4 business days after the clinic has received the results. If you do not hear from us within 7 days, please contact the clinic through OpenExchangehart or phone. If you have a critical or abnormal lab result, we will notify you by phone as soon as possible.  Submit refill requests through Vindi or call your pharmacy and they will forward the refill request to us. Please allow 3 business days for your refill to be completed.          Additional Information About Your Visit        MyChart Information     Vindi gives you secure access to your electronic health record. If you see a primary care provider, you can also send messages to your care team and make appointments. If you have questions, please call your primary care clinic.  If you do not have a primary care provider, please call [phone] and they will assist you.        Care EveryWhere ID     This is your Care EveryWhere ID. This could be used by other organizations to access your [place] medical records  [id number]        Your Vitals Were     Pulse Temperature Last Period Pulse Oximetry BMI (Body Mass Index)       77 98.8  F (37.1  C) (Tympanic) 10/29/2017 97%  24.53 kg/m2        Blood Pressure from Last 3 Encounters:   11/14/17 90/60   09/22/17 94/60   06/29/17 95/66    Weight from Last 3 Encounters:   11/14/17 160 lb (72.6 kg)   09/22/17 157 lb (71.2 kg)   06/29/17 155 lb 3.2 oz (70.4 kg)              We Performed the Following     Beta HCG qual IFA urine - [facility] and [facility] metabolic panel     UA reflex to Microscopic          Today's Medication Changes          These changes are accurate as of: 11/14/17 12:29 PM.  If you have any questions, ask your nurse or doctor.               Start taking these medicines.        Dose/Directions    ondansetron 4 MG ODT tab   Commonly known as:  ZOFRAN ODT   Used for:  Migraine with aura and without status migrainosus, not intractable, Nausea and vomiting, intractability of vomiting not specified, unspecified vomiting type   Started by:  Jonas Britton MD        Dose:  4-8 mg   Take 1-2 tablets (4-8 mg) by mouth 3 times daily (before meals)   Quantity:  20 tablet   Refills:  1            Where to get your medicines      These medications were sent to [facility]/pharmacy #[id number] - [place], [place] - [address]  [address] [place] [address] [facility] [place] [address]     Phone:  [phone]     ondansetron 4 MG ODT tab                Primary Care Provider Office Phone # Fax #    Jonas Britton [phone]       [address]  [place] [address]        Equal Access to Services     [facility]: Hadii tony clancyo Soken, waaxda luqadaha, qaybta kaalmada adeegyada, nasim posadas. So [facility] [phone].    ATENCIÓN: Si habla español, tiene a saldana disposición servicios gratuitos de asistencia lingüística. Llame al [phone].    We comply with applicable federal civil rights laws and [place] laws. We do not discriminate on the basis of race, color, national origin, age, disability, sex, sexual orientation, or gender identity.            Thank you!     Thank you for choosing  [facility] [place]  for your care. Our goal is always to provide you with excellent care. Hearing back from our patients is one way we can continue to improve our services. Please take a few minutes to complete the written survey that you may receive in the mail after your visit with us. Thank you!             Your Updated Medication List - Protect others around you: Learn how to safely use, store and throw away your medicines at www.disposemymeds.org.          This list is accurate as of: 11/14/17 12:29 PM.  Always use your most recent med list.                   Brand Name Dispense Instructions for use Diagnosis    desonide 0.05 % cream    DESOWEN    60 g    Apply sparingly to affected area three times daily for 14 days.    Lip dryness       naproxen 500 MG tablet    NAPROSYN    30 tablet    Take 1 tablet (500 mg) by mouth 2 times daily (with meals)    Plantar fasciitis       ondansetron 4 MG ODT tab    ZOFRAN ODT    20 tablet    Take 1-2 tablets (4-8 mg) by mouth 3 times daily (before meals)    Migraine with aura and without status migrainosus, not intractable, Nausea and vomiting, intractability of vomiting not specified, unspecified vomiting type       ZOLMitriptan 2.5 MG tablet    ZOMIG    18 tablet    Take 1-2 tablets (2.5-5 mg) by mouth at onset of headache for migraine May repeat in 2 hours. Max 4 tablets/24 hours.    Migraine with aura and without status migrainosus, not intractable

## 2017-11-15 LAB
ALBUMIN SERPL-MCNC: 3.9 G/DL (ref 3.4–5)
ALP SERPL-CCNC: 66 U/L (ref 40–150)
ALT SERPL W P-5'-P-CCNC: 23 U/L (ref 0–50)
ANION GAP SERPL CALCULATED.3IONS-SCNC: 8 MMOL/L (ref 3–14)
AST SERPL W P-5'-P-CCNC: 19 U/L (ref 0–45)
BILIRUB SERPL-MCNC: 0.5 MG/DL (ref 0.2–1.3)
BUN SERPL-MCNC: 5 MG/DL (ref 7–30)
CALCIUM SERPL-MCNC: 9 MG/DL (ref 8.5–10.1)
CHLORIDE SERPL-SCNC: 103 MMOL/L (ref 94–109)
CO2 SERPL-SCNC: 25 MMOL/L (ref 20–32)
CREAT SERPL-MCNC: 0.58 MG/DL (ref 0.52–1.04)
GFR SERPL CREATININE-BSD FRML MDRD: >90 ML/MIN/1.7M2
GLUCOSE SERPL-MCNC: 78 MG/DL (ref 70–99)
POTASSIUM SERPL-SCNC: 3.9 MMOL/L (ref 3.4–5.3)
PROT SERPL-MCNC: 8.4 G/DL (ref 6.8–8.8)
SODIUM SERPL-SCNC: 136 MMOL/L (ref 133–144)

## 2018-01-11 ENCOUNTER — OFFICE VISIT (OUTPATIENT)
Dept: FAMILY MEDICINE | Facility: CLINIC | Age: 33
End: 2018-01-11
Payer: COMMERCIAL

## 2018-01-11 VITALS
WEIGHT: 162 LBS | HEART RATE: 68 BPM | TEMPERATURE: 99.3 F | DIASTOLIC BLOOD PRESSURE: 60 MMHG | SYSTOLIC BLOOD PRESSURE: 104 MMHG | BODY MASS INDEX: 24.84 KG/M2 | OXYGEN SATURATION: 99 %

## 2018-01-11 DIAGNOSIS — G43.109 MIGRAINE WITH AURA AND WITHOUT STATUS MIGRAINOSUS, NOT INTRACTABLE: ICD-10-CM

## 2018-01-11 PROCEDURE — 99213 OFFICE O/P EST LOW 20 MIN: CPT | Performed by: FAMILY MEDICINE

## 2018-01-11 RX ORDER — ZOLMITRIPTAN 2.5 MG/1
2.5-5 TABLET, FILM COATED ORAL
Qty: 18 TABLET | Refills: 1 | Status: SHIPPED | OUTPATIENT
Start: 2018-01-11 | End: 2018-04-02

## 2018-01-11 NOTE — PROGRESS NOTES
SUBJECTIVE:   Aram Sloan is a 32 year old female who presents to clinic today for the following health issues:      Migraine Follow-Up    Headaches symptoms:  Stable symptoms due improve with the Zomig, pt is currently out and would like a refill. [place] out of [place] in the next month    Frequency: monthly     Duration of headaches: 3+ days     Able to do normal daily activities/work with migraines: Yes    Rescue/Relief medication:ibuprofen (Advil, Motrin) and Zomig              Effectiveness: total relief with Zomig    Preventative medication: None    Neurologic complications: No new stroke-like symptoms, loss of vision or speech, numbness or weakness    In the past 4 weeks, how often have you gone to Urgent Care or the emergency room because of your headaches?  0          Will be relocating to [place], [place] and needs refill of triptan.  She takes this 1-2 times per month and works very well at completely alleviating headache.     Problem list and histories reviewed & adjusted, as indicated.  Additional history: as documented    BP Readings from Last 3 Encounters:   01/11/18 104/60   11/14/17 90/60   09/22/17 94/60    Wt Readings from Last 3 Encounters:   01/11/18 162 lb (73.5 kg)   11/14/17 160 lb (72.6 kg)   09/22/17 157 lb (71.2 kg)                      Reviewed and updated as needed this visit by clinical staffTobacco  Allergies  Meds       Reviewed and updated as needed this visit by Provider         ROS:  C: NEGATIVE for fever, chills, change in weight  E/M: NEGATIVE for ear, mouth and throat problems  R: NEGATIVE for significant cough or SOB  CV: NEGATIVE for chest pain, palpitations or peripheral edema    OBJECTIVE:     /60  Pulse 68  Temp 99.3  F (37.4  C) (Tympanic)  Wt 162 lb (73.5 kg)  LMP 12/24/2017  SpO2 99%  BMI 24.84 kg/m2  Body mass index is 24.84 kg/(m^2).  GENERAL: healthy, alert and no distress  NEURO: Normal strength and tone, mentation intact and speech  normal  PSYCH: mentation appears normal, affect normal/bright    Diagnostic Test Results:  none     ASSESSMENT/PLAN:             1. Migraine with aura and without status migrainosus, not intractable  Doing very well with Zomig.  This is refilled today for the next 6-12 months.  Establish primary care with new physician in [facility].  - ZOLMitriptan (ZOMIG) 2.5 MG tablet; Take 1-2 tablets (2.5-5 mg) by mouth at onset of headache for migraine May repeat in 2 hours. Max 4 tablets/24 hours.  Dispense: 18 tablet; Refill: 1    See Patient Instructions    Timothy Mathews Jr, MD  [facility]

## 2018-01-11 NOTE — NURSING NOTE
"Chief Complaint   Patient presents with     Headache       Initial /60  Pulse 68  Temp 99.3  F (37.4  C) (Tympanic)  Wt 162 lb (73.5 kg)  LMP 12/24/2017  SpO2 99%  BMI 24.84 kg/m2 Estimated body mass index is 24.84 kg/(m^2) as calculated from the following:    Height as of 6/29/17: 5' 7.72\" (1.72 m).    Weight as of this encounter: 162 lb (73.5 kg).  Medication Reconciliation: complete    Current Outpatient Prescriptions   Medication Sig Dispense Refill     ondansetron (ZOFRAN ODT) 4 MG ODT tab Take 1-2 tablets (4-8 mg) by mouth 3 times daily (before meals) 20 tablet 1     ZOLMitriptan (ZOMIG) 2.5 MG tablet Take 1-2 tablets (2.5-5 mg) by mouth at onset of headache for migraine May repeat in 2 hours. Max 4 tablets/24 hours. 18 tablet 1     naproxen (NAPROSYN) 500 MG tablet Take 1 tablet (500 mg) by mouth 2 times daily (with meals) (Patient not taking: Reported on 9/22/2017) 30 tablet 0     desonide (DESOWEN) 0.05 % cream Apply sparingly to affected area three times daily for 14 days. (Patient not taking: Reported on 9/22/2017) 60 g 0       Jonah SNOW CMA  "

## 2018-01-11 NOTE — MR AVS SNAPSHOT
After Visit Summary   1/11/2018    Aram Sloan    MRN: [id number]           Patient Information     Date Of Birth          1985        Visit Information        Provider Department      1/11/2018 2:20 PM Timothy Mathews Jr., MD [facility]        Today's Diagnoses     Migraine with aura and without status migrainosus, not intractable           Follow-ups after your visit        Follow-up notes from your care team     Return if symptoms worsen or fail to improve.      Who to contact     If you have questions or need follow up information about today's clinic visit or your schedule please contact [facility] [place] directly at [phone].  Normal or non-critical lab and imaging results will be communicated to you by MyChart, letter or phone within 4 business days after the clinic has received the results. If you do not hear from us within 7 days, please contact the clinic through MyChart or phone. If you have a critical or abnormal lab result, we will notify you by phone as soon as possible.  Submit refill requests through Gangkr or call your pharmacy and they will forward the refill request to us. Please allow 3 business days for your refill to be completed.          Additional Information About Your Visit        MyChart Information     Gangkr gives you secure access to your electronic health record. If you see a primary care provider, you can also send messages to your care team and make appointments. If you have questions, please call your primary care clinic.  If you do not have a primary care provider, please call [phone] and they will assist you.        Care EveryWhere ID     This is your Care EveryWhere ID. This could be used by other organizations to access your [place] medical records  [id number]        Your Vitals Were     Pulse Temperature Last Period Pulse Oximetry BMI (Body Mass Index)       68 99.3  F (37.4  C) (Tympanic) 12/24/2017 99%  24.84 kg/m2        Blood Pressure from Last 3 Encounters:   01/11/18 104/60   11/14/17 90/60   09/22/17 94/60    Weight from Last 3 Encounters:   01/11/18 162 lb (73.5 kg)   11/14/17 160 lb (72.6 kg)   09/22/17 157 lb (71.2 kg)              Today, you had the following     No orders found for display         Where to get your medicines      These medications were sent to [facility] [place] - [place], [place] - [address] [place] [address] Drive  [address] [place] [address], [place] [address]     Phone:  [phone]     ZOLMitriptan 2.5 MG tablet          Primary Care Provider Office Phone # Fax #    Jonas Britton [phone]       [address] [place] [address] [address]  [place] [address]        Equal Access to Services     [facility]: Hadii tony thomas hadasho Sosagrarioali, waaxda luqadaha, qaybta kaalmada adeegyada, nasim posadas. So [facility] [phone].    ATENCIÓN: Si habla español, tiene a saldana disposición servicios gratuitos de asistencia lingüística. Llame al [phone].    We comply with applicable federal civil rights laws and [place] laws. We do not discriminate on the basis of race, color, national origin, age, disability, sex, sexual orientation, or gender identity.            Thank you!     Thank you for choosing [facility] [place]  for your care. Our goal is always to provide you with excellent care. Hearing back from our patients is one way we can continue to improve our services. Please take a few minutes to complete the written survey that you may receive in the mail after your visit with us. Thank you!             Your Updated Medication List - Protect others around you: Learn how to safely use, store and throw away your medicines at www.disposemymeds.org.          This list is accurate as of: 1/11/18  2:39 PM.  Always use your most recent med list.                   Brand Name Dispense Instructions for use Diagnosis    desonide 0.05 % cream    DESOWEN    60 g     Apply sparingly to affected area three times daily for 14 days.    Lip dryness       naproxen 500 MG tablet    NAPROSYN    30 tablet    Take 1 tablet (500 mg) by mouth 2 times daily (with meals)    Plantar fasciitis       ondansetron 4 MG ODT tab    ZOFRAN ODT    20 tablet    Take 1-2 tablets (4-8 mg) by mouth 3 times daily (before meals)    Migraine with aura and without status migrainosus, not intractable, Nausea and vomiting, intractability of vomiting not specified, unspecified vomiting type       ZOLMitriptan 2.5 MG tablet    ZOMIG    18 tablet    Take 1-2 tablets (2.5-5 mg) by mouth at onset of headache for migraine May repeat in 2 hours. Max 4 tablets/24 hours.    Migraine with aura and without status migrainosus, not intractable

## 2018-04-02 ENCOUNTER — MYC REFILL (OUTPATIENT)
Dept: FAMILY MEDICINE | Facility: CLINIC | Age: 33
End: 2018-04-02

## 2018-04-02 DIAGNOSIS — G43.109 MIGRAINE WITH AURA AND WITHOUT STATUS MIGRAINOSUS, NOT INTRACTABLE: ICD-10-CM

## 2018-04-03 RX ORDER — ZOLMITRIPTAN 2.5 MG/1
2.5-5 TABLET, FILM COATED ORAL
Qty: 9 TABLET | Refills: 0 | Status: SHIPPED | OUTPATIENT
Start: 2018-04-03 | End: 2018-10-08

## 2018-04-03 NOTE — TELEPHONE ENCOUNTER
"Requested Prescriptions   Pending Prescriptions Disp Refills     ZOLMitriptan (ZOMIG) 2.5 MG tablet  Last Written Prescription Date:  1/11/2018  Last Fill Quantity: 18 tablet,  # refills: 1   Last office visit: 1/11/2018 with prescribing provider:  Reid   Future Office Visit:       18 tablet 1     Sig: Take 1-2 tablets (2.5-5 mg) by mouth at onset of headache for migraine May repeat in 2 hours. Max 4 tablets/24 hours.    Serotonin Agonists Failed    4/3/2018  8:17 AM       Failed - Serotonin Agonist request needs review.    Please review patient's record. If patient has had 8 or more treatments in the past month, please forward to provider.         Passed - Blood pressure under 140/90 in past 12 months    BP Readings from Last 3 Encounters:   01/11/18 104/60   11/14/17 90/60   09/22/17 94/60            Passed - Recent (12 mo) or future (30 days) visit within the authorizing provider's specialty    Patient had office visit in the last 12 months or has a visit in the next 30 days with authorizing provider or within the authorizing provider's specialty.  See \"Patient Info\" tab in inbasket, or \"Choose Columns\" in Meds & Orders section of the refill encounter.           Passed - Patient is age 18 or older       Passed - No active pregnancy on record       Passed - No positive pregnancy test in past 12 months          "

## 2018-10-08 ENCOUNTER — MYC REFILL (OUTPATIENT)
Dept: FAMILY MEDICINE | Facility: CLINIC | Age: 33
End: 2018-10-08

## 2018-10-08 DIAGNOSIS — G43.109 MIGRAINE WITH AURA AND WITHOUT STATUS MIGRAINOSUS, NOT INTRACTABLE: ICD-10-CM

## 2018-10-09 RX ORDER — ZOLMITRIPTAN 2.5 MG/1
2.5-5 TABLET, FILM COATED ORAL
Qty: 9 TABLET | Refills: 0 | Status: SHIPPED | OUTPATIENT
Start: 2018-10-09

## 2018-10-09 NOTE — TELEPHONE ENCOUNTER
Prescription approved per [facility] Refill Protocol.  Jocelyn Henley, RN, BSN  [facility]

## 2018-10-09 NOTE — TELEPHONE ENCOUNTER
Message from Amiatot:  Original authorizing provider: Timothy Mathews Jr, MD    Aram Sloan would like a refill of the following medications:  ZOLMitriptan (ZOMIG) 2.5 MG tablet [Timothy Mathews Jr, MD]    Preferred pharmacy: [facility] - [place], [place] - [address] [facility] [address]    Comment:  Hi Sir, Good evening! I am running out of my medicine and requesting for refill. I will collect the medicines@[facility],[address], [place] [address]

## 2018-10-09 NOTE — TELEPHONE ENCOUNTER
"Requested Prescriptions   Pending Prescriptions Disp Refills     ZOLMitriptan (ZOMIG) 2.5 MG tablet  Last Written Prescription Date:  4/3/2018  Last Fill Quantity: 9 tablet,  # refills: 0   Last office visit: 1/11/2018 with prescribing provider:  Reid   Future Office Visit:       9 tablet 0     Sig: Take 1-2 tablets (2.5-5 mg) by mouth at onset of headache for migraine May repeat in 2 hours. Max 4 tablets/24 hours.    Serotonin Agonists Failed    10/9/2018  8:19 AM       Failed - Serotonin Agonist request needs review.    Please review patient's record. If patient has had 8 or more treatments in the past month, please forward to provider.         Passed - Blood pressure under 140/90 in past 12 months    BP Readings from Last 3 Encounters:   01/11/18 104/60   11/14/17 90/60   09/22/17 94/60            Passed - Recent (12 mo) or future (30 days) visit within the authorizing provider's specialty    Patient had office visit in the last 12 months or has a visit in the next 30 days with authorizing provider or within the authorizing provider's specialty.  See \"Patient Info\" tab in inbasket, or \"Choose Columns\" in Meds & Orders section of the refill encounter.           Passed - Patient is age 18 or older       Passed - No active pregnancy on record       Passed - No positive pregnancy test in past 12 months          "

## 2019-06-07 ENCOUNTER — MYC REFILL (OUTPATIENT)
Dept: FAMILY MEDICINE | Facility: CLINIC | Age: 34
End: 2019-06-07

## 2019-06-07 DIAGNOSIS — G43.109 MIGRAINE WITH AURA AND WITHOUT STATUS MIGRAINOSUS, NOT INTRACTABLE: ICD-10-CM

## 2019-06-10 NOTE — TELEPHONE ENCOUNTER
Routing refill request to provider for review/approval because:  Patient needs to be seen because it has been more than 1 year since last office visit.  Jocelyn Henley, UMUN, RN  [facility]

## 2019-06-10 NOTE — TELEPHONE ENCOUNTER
"Requested Prescriptions   Pending Prescriptions Disp Refills     ZOLMitriptan (ZOMIG) 2.5 MG tablet  Last Written Prescription Date:  10/9/2018  Last Fill Quantity: 9 tablet,  # refills: 0   Last office visit: 1/11/2018 with prescribing provider:  Reid     Future Office Visit:       9 tablet 0     Sig: Take 1-2 tablets (2.5-5 mg) by mouth at onset of headache for migraine May repeat in 2 hours. Max 4 tablets/24 hours.       Serotonin Agonists Failed - 6/10/2019 11:57 AM        Failed - Blood pressure under 140/90 in past 12 months     BP Readings from Last 3 Encounters:   01/11/18 104/60   11/14/17 90/60   09/22/17 94/60             Failed - Serotonin Agonist request needs review.     Please review patient's record. If patient has had 8 or more treatments in the past month, please forward to provider.          Failed - Recent (12 mo) or future (30 days) visit within the authorizing provider's specialty     Patient had office visit in the last 12 months or has a visit in the next 30 days with authorizing provider or within the authorizing provider's specialty.  See \"Patient Info\" tab in inbasket, or \"Choose Columns\" in Meds & Orders section of the refill encounter.              Passed - Medication is active on med list        Passed - Patient is age 18 or older        Passed - No active pregnancy on record        Passed - No positive pregnancy test in past 12 months        "

## 2019-06-11 RX ORDER — ZOLMITRIPTAN 2.5 MG/1
2.5-5 TABLET, FILM COATED ORAL
Qty: 9 TABLET | Refills: 0 | OUTPATIENT
Start: 2019-06-11

## 2019-06-11 NOTE — TELEPHONE ENCOUNTER
I recall this patient and believe she has moved from [place].  Please advise patient she should request this from her new PCP and should establish care with a PCP in her new location.  Alternatively, she can do [facility] or an E-visit to get her medication renewed while she searches for a new PCP.    Timothy Mathews MD

## 2020-03-10 ENCOUNTER — HEALTH MAINTENANCE LETTER (OUTPATIENT)
Age: 35
End: 2020-03-10

## 2020-12-27 ENCOUNTER — HEALTH MAINTENANCE LETTER (OUTPATIENT)
Age: 35
End: 2020-12-27

## 2021-04-24 ENCOUNTER — HEALTH MAINTENANCE LETTER (OUTPATIENT)
Age: 36
End: 2021-04-24

## 2021-10-09 ENCOUNTER — HEALTH MAINTENANCE LETTER (OUTPATIENT)
Age: 36
End: 2021-10-09

## 2022-04-04 NOTE — TELEPHONE ENCOUNTER
Message from MyChart:  Original authorizing provider: MD Aram Montiel Jr would like a refill of the following medications:  ZOLMitriptan (ZOMIG) 2.5 MG tablet [Timothy Mathews Jr, MD]    Preferred pharmacy: [facility] [id number], [place] [address]    Comment:     normal/soft/nontender/no distention

## 2022-05-16 ENCOUNTER — HEALTH MAINTENANCE LETTER (OUTPATIENT)
Age: 37
End: 2022-05-16

## 2022-09-11 ENCOUNTER — HEALTH MAINTENANCE LETTER (OUTPATIENT)
Age: 37
End: 2022-09-11

## 2023-06-03 ENCOUNTER — HEALTH MAINTENANCE LETTER (OUTPATIENT)
Age: 38
End: 2023-06-03